# Patient Record
Sex: MALE | Race: WHITE | NOT HISPANIC OR LATINO | Employment: OTHER | URBAN - METROPOLITAN AREA
[De-identification: names, ages, dates, MRNs, and addresses within clinical notes are randomized per-mention and may not be internally consistent; named-entity substitution may affect disease eponyms.]

---

## 2018-05-01 ENCOUNTER — TRANSCRIBE ORDERS (OUTPATIENT)
Dept: ADMINISTRATIVE | Facility: HOSPITAL | Age: 83
End: 2018-05-01

## 2018-05-01 DIAGNOSIS — M15.9 GENERALIZED OA: Primary | ICD-10-CM

## 2018-05-03 ENCOUNTER — HOSPITAL ENCOUNTER (OUTPATIENT)
Dept: CT IMAGING | Facility: HOSPITAL | Age: 83
Discharge: HOME | End: 2018-05-03
Attending: ORTHOPAEDIC SURGERY | Admitting: ORTHOPAEDIC SURGERY

## 2018-05-03 ENCOUNTER — APPOINTMENT (OUTPATIENT)
Dept: PREADMISSION TESTING | Facility: HOSPITAL | Age: 83
End: 2018-05-03

## 2018-05-03 ENCOUNTER — APPOINTMENT (OUTPATIENT)
Dept: CT IMAGING | Facility: HOSPITAL | Age: 83
End: 2018-05-03
Attending: ORTHOPAEDIC SURGERY

## 2018-05-03 DIAGNOSIS — M15.9 GENERALIZED OA: ICD-10-CM

## 2018-05-03 PROCEDURE — 73200 CT UPPER EXTREMITY W/O DYE: CPT

## 2018-05-10 ENCOUNTER — APPOINTMENT (OUTPATIENT)
Dept: PREADMISSION TESTING | Facility: HOSPITAL | Age: 83
End: 2018-05-10

## 2018-05-10 ENCOUNTER — HOSPITAL ENCOUNTER (OUTPATIENT)
Dept: GENERAL RADIOLOGY | Facility: HOSPITAL | Age: 83
Discharge: HOME | End: 2018-05-10
Admitting: ORTHOPAEDIC SURGERY

## 2018-05-10 VITALS
WEIGHT: 205.4 LBS | RESPIRATION RATE: 16 BRPM | BODY MASS INDEX: 30.42 KG/M2 | DIASTOLIC BLOOD PRESSURE: 66 MMHG | TEMPERATURE: 97.5 F | SYSTOLIC BLOOD PRESSURE: 140 MMHG | HEART RATE: 64 BPM | HEIGHT: 69 IN | OXYGEN SATURATION: 96 %

## 2018-05-10 LAB
ABO GROUP BLD: NORMAL
ANION GAP SERPL CALCULATED.3IONS-SCNC: 12.5 MMOL/L
BILIRUB UR QL STRIP: NEGATIVE
BLD GP AB SCN SERPL QL: NEGATIVE
BUN BLD-MCNC: 17 MG/DL (ref 8–23)
BUN/CREAT SERPL: 18.5 (ref 7–25)
CALCIUM SPEC-SCNC: 9.3 MG/DL (ref 8.6–10.5)
CHLORIDE SERPL-SCNC: 102 MMOL/L (ref 98–107)
CLARITY UR: CLEAR
CO2 SERPL-SCNC: 27.5 MMOL/L (ref 22–29)
COLOR UR: YELLOW
CREAT BLD-MCNC: 0.92 MG/DL (ref 0.76–1.27)
DEPRECATED RDW RBC AUTO: 49.2 FL (ref 37–54)
ERYTHROCYTE [DISTWIDTH] IN BLOOD BY AUTOMATED COUNT: 13 % (ref 11.5–14.5)
GFR SERPL CREATININE-BSD FRML MDRD: 79 ML/MIN/1.73
GLUCOSE BLD-MCNC: 119 MG/DL (ref 65–99)
GLUCOSE UR STRIP-MCNC: NEGATIVE MG/DL
HCT VFR BLD AUTO: 46.5 % (ref 40.4–52.2)
HGB BLD-MCNC: 15.1 G/DL (ref 13.7–17.6)
HGB UR QL STRIP.AUTO: NEGATIVE
KETONES UR QL STRIP: NEGATIVE
LEUKOCYTE ESTERASE UR QL STRIP.AUTO: NEGATIVE
MCH RBC QN AUTO: 33.5 PG (ref 27–32.7)
MCHC RBC AUTO-ENTMCNC: 32.5 G/DL (ref 32.6–36.4)
MCV RBC AUTO: 103.1 FL (ref 79.8–96.2)
NITRITE UR QL STRIP: NEGATIVE
PH UR STRIP.AUTO: 7 [PH] (ref 5–8)
PLATELET # BLD AUTO: 169 10*3/MM3 (ref 140–500)
PMV BLD AUTO: 9.9 FL (ref 6–12)
POTASSIUM BLD-SCNC: 4.7 MMOL/L (ref 3.5–5.2)
PROT UR QL STRIP: NEGATIVE
RBC # BLD AUTO: 4.51 10*6/MM3 (ref 4.6–6)
RH BLD: POSITIVE
SODIUM BLD-SCNC: 142 MMOL/L (ref 136–145)
SP GR UR STRIP: 1.02 (ref 1–1.03)
T&S EXPIRATION DATE: NORMAL
UROBILINOGEN UR QL STRIP: NORMAL
WBC NRBC COR # BLD: 5.14 10*3/MM3 (ref 4.5–10.7)

## 2018-05-10 PROCEDURE — 86900 BLOOD TYPING SEROLOGIC ABO: CPT | Performed by: ORTHOPAEDIC SURGERY

## 2018-05-10 PROCEDURE — 73030 X-RAY EXAM OF SHOULDER: CPT

## 2018-05-10 PROCEDURE — 86901 BLOOD TYPING SEROLOGIC RH(D): CPT | Performed by: ORTHOPAEDIC SURGERY

## 2018-05-10 PROCEDURE — 81003 URINALYSIS AUTO W/O SCOPE: CPT | Performed by: ORTHOPAEDIC SURGERY

## 2018-05-10 PROCEDURE — 86850 RBC ANTIBODY SCREEN: CPT | Performed by: ORTHOPAEDIC SURGERY

## 2018-05-10 PROCEDURE — 36415 COLL VENOUS BLD VENIPUNCTURE: CPT

## 2018-05-10 PROCEDURE — 85027 COMPLETE CBC AUTOMATED: CPT | Performed by: ORTHOPAEDIC SURGERY

## 2018-05-10 PROCEDURE — 80048 BASIC METABOLIC PNL TOTAL CA: CPT | Performed by: ORTHOPAEDIC SURGERY

## 2018-05-10 RX ORDER — CHLORHEXIDINE GLUCONATE 500 MG/1
1 CLOTH TOPICAL TAKE AS DIRECTED
Status: ON HOLD | COMMUNITY
End: 2018-05-17

## 2018-05-10 RX ORDER — MOMETASONE FUROATE 50 UG/1
1 SPRAY, METERED NASAL DAILY
COMMUNITY

## 2018-05-10 RX ORDER — UBIDECARENONE 100 MG
100 CAPSULE ORAL DAILY
COMMUNITY

## 2018-05-10 NOTE — DISCHARGE INSTRUCTIONS
Take the following medications the morning of surgery with a small sip of water:  NONE    THE HOSPITAL WILL CALL YOU THE DAY BEFORE WITH YOUR ARRIVAL TIME.      General Instructions:  • Do not eat solid food after midnight the night before surgery.  • You may drink clear liquids day of surgery but must stop at least one hour before your hospital arrival time.  • It is beneficial for you to have a clear drink that contains carbohydrates the day of surgery.  We suggest a 12 to 20 ounce bottle of Gatorade or Powerade for non-diabetic patients or a 12 to 20 ounce bottle of G2 or Powerade Zero for diabetic patients. (Pediatric patients, are not advised to drink a 12 to 20 ounce carbohydrate drink)    Clear liquids are liquids you can see through.  Nothing red in color.     Plain water                               Sports drinks  Sodas                                   Gelatin (Jell-O)  Fruit juices without pulp such as white grape juice and apple juice  Popsicles that contain no fruit or yogurt  Tea or coffee (no cream or milk added)  Gatorade / Powerade  G2 / Powerade Zero    • Infants may have breast milk up to four hours before surgery.  • Infants drinking formula may drink formula up to six hours before surgery.   • Patients who avoid smoking, chewing tobacco and alcohol for 4 weeks prior to surgery have a reduced risk of post-operative complications.  Quit smoking as many days before surgery as you can.  • Do not smoke, use chewing tobacco or drink alcohol the day of surgery.   • If applicable bring your C-PAP/ BI-PAP machine.  • Bring any papers given to you in the doctor’s office.  • Wear clean comfortable clothes and socks.  • Do not wear contact lenses or make-up.  Bring a case for your glasses.   • Bring crutches or walker if applicable.  • Remove all piercings.  Leave jewelry and any other valuables at home.  • Hair extensions with metal clips must be removed prior to surgery.  • The Pre-Admission Testing  nurse will instruct you to bring medications if unable to obtain an accurate list in Pre-Admission Testing.        If you were given a blood bank ID arm band remember to bring it with you the day of surgery.    Preventing a Surgical Site Infection:  • For 2 to 3 days before surgery, avoid shaving with a razor because the razor can irritate skin and make it easier to develop an infection.  • The night prior to surgery sleep in a clean bed with clean clothing.  Do not allow pets to sleep with you.  • Shower on the morning of surgery using a fresh bar of anti-bacterial soap (such as Dial) and clean washcloth.  Dry with a clean towel and dress in clean clothing.  • Ask your surgeon if you will be receiving antibiotics prior to surgery.  • Make sure you, your family, and all healthcare providers clean their hands with soap and water or an alcohol based hand  before caring for you or your wound.    Day of surgery:  Upon arrival, a Pre-op nurse and Anesthesiologist will review your health history, obtain vital signs, and answer questions you may have.  The only belongings needed at this time will be your home medications and if applicable your C-PAP/BI-PAP machine.  If you are staying overnight your family can leave the rest of your belongings in the car and bring them to your room later.  A Pre-op nurse will start an IV and you may receive medication in preparation for surgery, including something to help you relax.  Your family will be able to see you in the Pre-op area.  While you are in surgery your family should notify the waiting room  if they leave the waiting room area and provide a contact phone number.    Please be aware that surgery does come with discomfort.  We want to make every effort to control your discomfort so please discuss any uncontrolled symptoms with your nurse.   Your doctor will most likely have prescribed pain medications.      If you are going home after surgery you will  receive individualized written care instructions before being discharged.  A responsible adult must drive you to and from the hospital on the day of your surgery and stay with you for 24 hours.    If you are staying overnight following surgery, you will be transported to your hospital room following the recovery period.  [facility] has all private rooms.    If you have any questions please call Pre-Admission Testing at [phone].  Deductibles and co-payments are collected on the day of service. Please be prepared to pay the required co-pay, deductible or deposit on the day of service as defined by your plan.      2% CHLORAHEXIDINE GLUCONATE* CLOTH  Preparing or “prepping” skin before surgery can reduce the risk of infection at the surgical site. To make the process easier, [facility] has chosen disposable cloths moistened with a rinse-free, 2% Chlorhexidine Gluconate (CHG) antiseptic solution. The steps below outline the prepping process and should be carefully followed.        Use the prep cloth on the area that is circled in the diagram             Directions Night before Surgery  1) Shower using a fresh bar of anti-bacterial soap (such as Dial) and clean washcloth.  Use a clean towel to completely dry your skin.  2) Do not use any lotions, oils or creams on your skin.  3) Open the package and remove 1 cloth, wipe your skin for 30 seconds in a circular motion.  Allow to dry for 3 minutes.  4) Repeat #3 with second cloth.  5) Do not touch your eyes, ears, or mouth with the prep cloth.  6) Allow the wet prep solution to air dry.  7) Discard the prep cloth and wash your hands with soap and water.   8) Dress in clean bed clothes and sleep on fresh clean bed sheets.   9) You may experience some temporary itching after the prep.    Directions Day of Surgery  1) Repeat steps 1,2,3,4,5,6,7, and 9.   2) Dress in clean clothes before coming to the hospital.    BACTROBAN NASAL OINTMENT  There are  many germs normally in your nose. Bactroban is an ointment that will help reduce these germs. Please follow these instructions for Bactroban use:      ____The day before surgery in the morning  Date________    ____The day before surgery in the evening              Date________    ____The day of surgery in the morning    Date________    **Squirt ½ package of Bactroban Ointment onto a cotton applicator and apply to inside of 1st nostril.  Squirt the remaining Bactroban and apply to the inside of the other nostril.

## 2018-05-15 NOTE — H&P
Provider: HONG COHEN MD  [facility]  Chief complaint right shoulder pain.  83-year-old  male has had ongoing pain in the right shoulder.  He has had previous injection therapy.  He'D like to move on the definitive care.  Review of Systems:  Patient denies: Abdominal Pain, Bleeding, Chest Pain, Convulsions/Seizure, Decreased Motion, Depression, Difficulty Swallowing, Easy Bruisability, Emotional Disturbances, Eyes or Vision Problems, Fecal Incontinence, Fever/Chills, Headaches, Increased Thirst, Increased Hunger, Insomnia, Joint Pain, Nausea/Vomiting, Night Sweats, Poor Balance, Persistent Cough, Rash, Shortness of Breath, Shortness of Breath While Lying down, Skin Problems, Urinary Retention and Weakness.  Allergies:  * no known allergies  Medications:  flonase 50 mcg/act nasal suspension (fluticasone propionate)   aspirin 81 mg tabs (aspirin)   ocuvite extra oral tablet (multiple vitamins-minerals) ocne daily  glucosamine hcl tablet (glucosamine hcl tabs) once daily  co q 10 100 mg oral capsule (coenzyme q10) once daily  lisinopril 20 mg oral tablet (lisinopril) once daily  lipitor 20 mg oral tablet (atorvastatin calcium) once daily  plavix 75 mg oral tablet (clopidogrel bisulfate) once daily  Patient History of:  TRANSIENT ISCHEMIC ATTACK (TIA)  PROSTATE  HIGH CHOLESTEROL  BLOOD CLOTS/EMBOLISM - NEGATIVE  HYPERTENSION  CANCER - PROSTATE  Surgical History:  Hernia Repair-[[id number]]   No Previous Non-orthopaedic Surgery-history   right Elbow-   left Knee-   Known Family History of:  Negative  Social History:  Social history taken on 04/30/2018 states PREETI DARLING is a  83 year old male.  He has never used tobacco products.  He has not fallen in the last 12 months.      Past medical, social, family histories and ROS reviewed today with the patient and changes documented in the chart (04/30/2018).  PCP Dr. JANEEN SALAZAR, [facility]    Physical Exam  Height:  68 in.    Weight:  211.2 lbs.     BMI:  32.23  Pulse:   63  Blood pressure:  120 / 82 mm Hg  Mental/HEENT/Cardio/Skin  Orientation is orientation was alert and oriented x3.    Right Shoulder  Skin is normal.  Lymphadenopathy is negative.  Right shoulder active ROM today shows: Elevation = 140; ER(side) = 30; ER(abd) = 60; IR(abd) = 70; IR(vert) = to waist; Abduction = 120.  Shoulder strength: Elevation = 4+/5; ER = 4/5; IR = 4+/5; ABD = 4/5.  Crepitation in the GH.  There is tenderness in the anterior shoulder capsule.  Neer test is positive.  Rangel test is positive.  Empty can test was positive.  Speed's test is positive.  [place]'s test is positive.  Pulses are normal.  Normal sensation.  The c-spine has normal motion.  Spurling right is negative.  Spurling left is negative.  Adson's test is negative.  NV intact distally.        Imaging/Diagnostic Studies  X-rays of the Right Shoulder [AP;Axillary;Grashey;Y] were ordered and reviewed today.    Three-view x-ray right shoulder shows bone-on-bone osteoarthritic change with some posterior erosion  CT UPPER EXTREMITY RIGHT WO CONTRAST:   CT UPPER EXTREMITY RIGHT WO CONTRAST:   including automated exposure control and exposure modulation based on  body size.  .  FINDINGS: There is advanced degenerative change at the glenohumeral  joint with full-thickness chondral loss and some mild bony remodeling  along the posterior two-thirds of the glenoid. This creates 15? to 20?  of retroversion of the articular surface of the glenoid. There are  marginal osteophytes and some subcortical sclerosis but no significant  subcortical cystic change.  .  The rotator cuff muscles are normal in attenuation and bulk. There is no  musculotendinous retraction. The long head biceps tendon is intact. Some  acromioclavicular arthropathy is observed with mild bony undersurface  prominence.  .  There is no lesion in the visualized axilla or the visualized part of  the right lung.  .  Advanced arthritic change at the right glenohumeral joint  with  some bony remodeling at the glenoid as described. There is no  evidence of a complete, chronic full-thickness rotator cuff tear.    Impression  Right glenohumeral joint osteoarthritis    Plan  Patient does not use tobacco.  We discussed the findings in detail.  I recommend a right reverse total shoulder arthroplasty.   We discussed the benefits of surgical intervention, as well as alternative treatments.  Potential surgical risks and complications include but are not limited to DVT, infection, neurovascular injury, fracture, implant wear, failure, possible need for revision surgery, loss of motion, dislocation, limb length changes.  Sufficient opportunity was given to discuss the condition and treatment plan with the doctor, and all questions were answered for the patient.  Nonsurgical measures such as injections, medications, or physical therapy may not offer significant relief to this patient.

## 2018-05-17 ENCOUNTER — APPOINTMENT (OUTPATIENT)
Dept: GENERAL RADIOLOGY | Facility: HOSPITAL | Age: 83
End: 2018-05-17

## 2018-05-17 ENCOUNTER — HOSPITAL ENCOUNTER (INPATIENT)
Facility: HOSPITAL | Age: 83
LOS: 1 days | Discharge: HOME | End: 2018-05-18
Attending: ORTHOPAEDIC SURGERY | Admitting: ORTHOPAEDIC SURGERY

## 2018-05-17 ENCOUNTER — ANESTHESIA EVENT (OUTPATIENT)
Dept: PERIOP | Facility: HOSPITAL | Age: 83
End: 2018-05-17

## 2018-05-17 ENCOUNTER — ANESTHESIA (OUTPATIENT)
Dept: PERIOP | Facility: HOSPITAL | Age: 83
End: 2018-05-17

## 2018-05-17 PROBLEM — M25.311 ROTATOR CUFF INSUFFICIENCY OF RIGHT SHOULDER: Status: ACTIVE | Noted: 2018-05-17

## 2018-05-17 PROCEDURE — C1776 JOINT DEVICE (IMPLANTABLE): HCPCS | Performed by: ORTHOPAEDIC SURGERY

## 2018-05-17 PROCEDURE — 25010000002 FENTANYL CITRATE (PF) 100 MCG/2ML SOLUTION: Performed by: ANESTHESIOLOGY

## 2018-05-17 PROCEDURE — 25010000002 ROPIVACAINE PER 1 MG: Performed by: ANESTHESIOLOGY

## 2018-05-17 PROCEDURE — 25010000003 CEFAZOLIN IN DEXTROSE 2-4 GM/100ML-% SOLUTION: Performed by: ORTHOPAEDIC SURGERY

## 2018-05-17 PROCEDURE — 25010000002 ONDANSETRON PER 1 MG: Performed by: NURSE ANESTHETIST, CERTIFIED REGISTERED

## 2018-05-17 PROCEDURE — 25010000002 NEOSTIGMINE PER 0.5 MG: Performed by: NURSE ANESTHETIST, CERTIFIED REGISTERED

## 2018-05-17 PROCEDURE — 25010000002 PROPOFOL 10 MG/ML EMULSION: Performed by: NURSE ANESTHETIST, CERTIFIED REGISTERED

## 2018-05-17 PROCEDURE — 25010000002 DEXAMETHASONE PER 1 MG: Performed by: ANESTHESIOLOGY

## 2018-05-17 PROCEDURE — 0RRJ00Z REPLACEMENT OF RIGHT SHOULDER JOINT WITH REVERSE BALL AND SOCKET SYNTHETIC SUBSTITUTE, OPEN APPROACH: ICD-10-PCS | Performed by: ORTHOPAEDIC SURGERY

## 2018-05-17 PROCEDURE — 73020 X-RAY EXAM OF SHOULDER: CPT

## 2018-05-17 PROCEDURE — 25010000002 MIDAZOLAM PER 1 MG: Performed by: ANESTHESIOLOGY

## 2018-05-17 DEVICE — TRY HUM EQUINOXE ADPT REV SHLDR PLS0: Type: IMPLANTABLE DEVICE | Site: SHOULDER | Status: FUNCTIONAL

## 2018-05-17 DEVICE — IMPLANTABLE DEVICE: Type: IMPLANTABLE DEVICE | Site: HUMERUS | Status: FUNCTIONAL

## 2018-05-17 DEVICE — SCRW EQUINOXE TORQ DEFINE REV SHLDR KT: Type: IMPLANTABLE DEVICE | Site: SHOULDER | Status: FUNCTIONAL

## 2018-05-17 DEVICE — GLENOSPHERE SHLDR/REV EQUINOXE 42MM: Type: IMPLANTABLE DEVICE | Site: SHOULDER | Status: FUNCTIONAL

## 2018-05-17 DEVICE — SCRW COMPR EQUINOXE LK 4.5X26MM: Type: IMPLANTABLE DEVICE | Site: SHOULDER | Status: FUNCTIONAL

## 2018-05-17 DEVICE — SCRW COMPR EQUINOXE LK 4.5X22MM: Type: IMPLANTABLE DEVICE | Site: SHOULDER | Status: FUNCTIONAL

## 2018-05-17 DEVICE — SCRW COMPR EQUINOXE LK 4.5X30MM: Type: IMPLANTABLE DEVICE | Site: SHOULDER | Status: FUNCTIONAL

## 2018-05-17 DEVICE — LINER HUM EQUINOXE REV SHLDR 42 PLS2.5: Type: IMPLANTABLE DEVICE | Site: SHOULDER | Status: FUNCTIONAL

## 2018-05-17 DEVICE — SCRW LK EQUINOXE GLENOSPHERE REV/SHLDR: Type: IMPLANTABLE DEVICE | Site: SHOULDER | Status: FUNCTIONAL

## 2018-05-17 DEVICE — TOTL SHLDR AUG PLT ARTHROPLASTY UPCHRG: Type: IMPLANTABLE DEVICE | Site: HUMERUS | Status: FUNCTIONAL

## 2018-05-17 DEVICE — SCRW COMPR EQUINOXE LK 4.5X18MM: Type: IMPLANTABLE DEVICE | Site: SHOULDER | Status: FUNCTIONAL

## 2018-05-17 DEVICE — PLT GLEN JNT EQUINOXE AUG POST 8DEG RT: Type: IMPLANTABLE DEVICE | Site: SHOULDER | Status: FUNCTIONAL

## 2018-05-17 RX ORDER — FLUMAZENIL 0.1 MG/ML
0.2 INJECTION INTRAVENOUS AS NEEDED
Status: DISCONTINUED | OUTPATIENT
Start: 2018-05-17 | End: 2018-05-17 | Stop reason: HOSPADM

## 2018-05-17 RX ORDER — HYDROMORPHONE HYDROCHLORIDE 1 MG/ML
0.5 INJECTION, SOLUTION INTRAMUSCULAR; INTRAVENOUS; SUBCUTANEOUS
Status: DISCONTINUED | OUTPATIENT
Start: 2018-05-17 | End: 2018-05-17 | Stop reason: HOSPADM

## 2018-05-17 RX ORDER — NALOXONE HCL 0.4 MG/ML
0.4 VIAL (ML) INJECTION
Status: DISCONTINUED | OUTPATIENT
Start: 2018-05-17 | End: 2018-05-18 | Stop reason: HOSPADM

## 2018-05-17 RX ORDER — HYDRALAZINE HYDROCHLORIDE 20 MG/ML
5 INJECTION INTRAMUSCULAR; INTRAVENOUS
Status: DISCONTINUED | OUTPATIENT
Start: 2018-05-17 | End: 2018-05-17 | Stop reason: HOSPADM

## 2018-05-17 RX ORDER — EPHEDRINE SULFATE 50 MG/ML
5 INJECTION, SOLUTION INTRAVENOUS ONCE AS NEEDED
Status: DISCONTINUED | OUTPATIENT
Start: 2018-05-17 | End: 2018-05-17 | Stop reason: HOSPADM

## 2018-05-17 RX ORDER — LABETALOL HYDROCHLORIDE 5 MG/ML
5 INJECTION, SOLUTION INTRAVENOUS
Status: DISCONTINUED | OUTPATIENT
Start: 2018-05-17 | End: 2018-05-17 | Stop reason: HOSPADM

## 2018-05-17 RX ORDER — ONDANSETRON 4 MG/1
4 TABLET, ORALLY DISINTEGRATING ORAL EVERY 6 HOURS PRN
Status: DISCONTINUED | OUTPATIENT
Start: 2018-05-17 | End: 2018-05-18 | Stop reason: HOSPADM

## 2018-05-17 RX ORDER — DEXAMETHASONE SODIUM PHOSPHATE 4 MG/ML
INJECTION, SOLUTION INTRA-ARTICULAR; INTRALESIONAL; INTRAMUSCULAR; INTRAVENOUS; SOFT TISSUE AS NEEDED
Status: DISCONTINUED | OUTPATIENT
Start: 2018-05-17 | End: 2018-05-17 | Stop reason: SURG

## 2018-05-17 RX ORDER — MAGNESIUM HYDROXIDE 1200 MG/15ML
LIQUID ORAL AS NEEDED
Status: DISCONTINUED | OUTPATIENT
Start: 2018-05-17 | End: 2018-05-17 | Stop reason: HOSPADM

## 2018-05-17 RX ORDER — CLOPIDOGREL BISULFATE 75 MG/1
75 TABLET ORAL DAILY
Status: DISCONTINUED | OUTPATIENT
Start: 2018-05-17 | End: 2018-05-18 | Stop reason: HOSPADM

## 2018-05-17 RX ORDER — PROMETHAZINE HYDROCHLORIDE 25 MG/ML
6.25 INJECTION, SOLUTION INTRAMUSCULAR; INTRAVENOUS ONCE AS NEEDED
Status: DISCONTINUED | OUTPATIENT
Start: 2018-05-17 | End: 2018-05-17 | Stop reason: HOSPADM

## 2018-05-17 RX ORDER — GLYCOPYRROLATE 0.2 MG/ML
INJECTION INTRAMUSCULAR; INTRAVENOUS AS NEEDED
Status: DISCONTINUED | OUTPATIENT
Start: 2018-05-17 | End: 2018-05-17 | Stop reason: SURG

## 2018-05-17 RX ORDER — PROMETHAZINE HYDROCHLORIDE 25 MG/1
25 SUPPOSITORY RECTAL ONCE AS NEEDED
Status: DISCONTINUED | OUTPATIENT
Start: 2018-05-17 | End: 2018-05-17 | Stop reason: HOSPADM

## 2018-05-17 RX ORDER — ONDANSETRON 2 MG/ML
4 INJECTION INTRAMUSCULAR; INTRAVENOUS EVERY 6 HOURS PRN
Status: DISCONTINUED | OUTPATIENT
Start: 2018-05-17 | End: 2018-05-18 | Stop reason: HOSPADM

## 2018-05-17 RX ORDER — PROMETHAZINE HYDROCHLORIDE 25 MG/1
25 TABLET ORAL ONCE AS NEEDED
Status: DISCONTINUED | OUTPATIENT
Start: 2018-05-17 | End: 2018-05-17 | Stop reason: HOSPADM

## 2018-05-17 RX ORDER — ASPIRIN 81 MG/1
81 TABLET, CHEWABLE ORAL DAILY
Status: DISCONTINUED | OUTPATIENT
Start: 2018-05-18 | End: 2018-05-18 | Stop reason: HOSPADM

## 2018-05-17 RX ORDER — SODIUM CHLORIDE 0.9 % (FLUSH) 0.9 %
1-10 SYRINGE (ML) INJECTION AS NEEDED
Status: DISCONTINUED | OUTPATIENT
Start: 2018-05-17 | End: 2018-05-17 | Stop reason: HOSPADM

## 2018-05-17 RX ORDER — MIDAZOLAM HYDROCHLORIDE 1 MG/ML
1 INJECTION INTRAMUSCULAR; INTRAVENOUS
Status: DISCONTINUED | OUTPATIENT
Start: 2018-05-17 | End: 2018-05-17 | Stop reason: HOSPADM

## 2018-05-17 RX ORDER — ONDANSETRON 2 MG/ML
INJECTION INTRAMUSCULAR; INTRAVENOUS AS NEEDED
Status: DISCONTINUED | OUTPATIENT
Start: 2018-05-17 | End: 2018-05-17 | Stop reason: SURG

## 2018-05-17 RX ORDER — CEFAZOLIN SODIUM 2 G/100ML
2 INJECTION, SOLUTION INTRAVENOUS ONCE
Status: COMPLETED | OUTPATIENT
Start: 2018-05-17 | End: 2018-05-17

## 2018-05-17 RX ORDER — ONDANSETRON 4 MG/1
4 TABLET, FILM COATED ORAL EVERY 6 HOURS PRN
Status: DISCONTINUED | OUTPATIENT
Start: 2018-05-17 | End: 2018-05-18 | Stop reason: HOSPADM

## 2018-05-17 RX ORDER — SENNA AND DOCUSATE SODIUM 50; 8.6 MG/1; MG/1
2 TABLET, FILM COATED ORAL NIGHTLY
Status: DISCONTINUED | OUTPATIENT
Start: 2018-05-18 | End: 2018-05-18 | Stop reason: HOSPADM

## 2018-05-17 RX ORDER — FENTANYL CITRATE 50 UG/ML
100 INJECTION, SOLUTION INTRAMUSCULAR; INTRAVENOUS
Status: DISCONTINUED | OUTPATIENT
Start: 2018-05-17 | End: 2018-05-17 | Stop reason: HOSPADM

## 2018-05-17 RX ORDER — DIAZEPAM 5 MG/1
5 TABLET ORAL EVERY 6 HOURS PRN
Status: DISCONTINUED | OUTPATIENT
Start: 2018-05-17 | End: 2018-05-18 | Stop reason: HOSPADM

## 2018-05-17 RX ORDER — PROPOFOL 10 MG/ML
VIAL (ML) INTRAVENOUS AS NEEDED
Status: DISCONTINUED | OUTPATIENT
Start: 2018-05-17 | End: 2018-05-17 | Stop reason: SURG

## 2018-05-17 RX ORDER — ROCURONIUM BROMIDE 10 MG/ML
INJECTION, SOLUTION INTRAVENOUS AS NEEDED
Status: DISCONTINUED | OUTPATIENT
Start: 2018-05-17 | End: 2018-05-17 | Stop reason: SURG

## 2018-05-17 RX ORDER — HYDROCODONE BITARTRATE AND ACETAMINOPHEN 7.5; 325 MG/1; MG/1
1 TABLET ORAL ONCE AS NEEDED
Status: DISCONTINUED | OUTPATIENT
Start: 2018-05-17 | End: 2018-05-17 | Stop reason: HOSPADM

## 2018-05-17 RX ORDER — LISINOPRIL 2.5 MG/1
2.5 TABLET ORAL DAILY
Status: DISCONTINUED | OUTPATIENT
Start: 2018-05-17 | End: 2018-05-18 | Stop reason: HOSPADM

## 2018-05-17 RX ORDER — FENTANYL CITRATE 50 UG/ML
50 INJECTION, SOLUTION INTRAMUSCULAR; INTRAVENOUS
Status: DISCONTINUED | OUTPATIENT
Start: 2018-05-17 | End: 2018-05-17 | Stop reason: HOSPADM

## 2018-05-17 RX ORDER — DIPHENHYDRAMINE HYDROCHLORIDE 50 MG/ML
6.25 INJECTION INTRAMUSCULAR; INTRAVENOUS
Status: DISCONTINUED | OUTPATIENT
Start: 2018-05-17 | End: 2018-05-17 | Stop reason: HOSPADM

## 2018-05-17 RX ORDER — SODIUM CHLORIDE, SODIUM LACTATE, POTASSIUM CHLORIDE, CALCIUM CHLORIDE 600; 310; 30; 20 MG/100ML; MG/100ML; MG/100ML; MG/100ML
9 INJECTION, SOLUTION INTRAVENOUS CONTINUOUS
Status: DISCONTINUED | OUTPATIENT
Start: 2018-05-17 | End: 2018-05-18 | Stop reason: HOSPADM

## 2018-05-17 RX ORDER — FAMOTIDINE 10 MG/ML
20 INJECTION, SOLUTION INTRAVENOUS ONCE
Status: COMPLETED | OUTPATIENT
Start: 2018-05-17 | End: 2018-05-17

## 2018-05-17 RX ORDER — ONDANSETRON 2 MG/ML
4 INJECTION INTRAMUSCULAR; INTRAVENOUS ONCE AS NEEDED
Status: DISCONTINUED | OUTPATIENT
Start: 2018-05-17 | End: 2018-05-17 | Stop reason: HOSPADM

## 2018-05-17 RX ORDER — CEFAZOLIN SODIUM 2 G/100ML
2 INJECTION, SOLUTION INTRAVENOUS EVERY 8 HOURS
Status: COMPLETED | OUTPATIENT
Start: 2018-05-17 | End: 2018-05-18

## 2018-05-17 RX ORDER — MIDAZOLAM HYDROCHLORIDE 1 MG/ML
2 INJECTION INTRAMUSCULAR; INTRAVENOUS
Status: DISCONTINUED | OUTPATIENT
Start: 2018-05-17 | End: 2018-05-17 | Stop reason: HOSPADM

## 2018-05-17 RX ORDER — SODIUM CHLORIDE 450 MG/100ML
75 INJECTION, SOLUTION INTRAVENOUS CONTINUOUS
Status: DISCONTINUED | OUTPATIENT
Start: 2018-05-17 | End: 2018-05-18 | Stop reason: HOSPADM

## 2018-05-17 RX ORDER — ATORVASTATIN CALCIUM 20 MG/1
20 TABLET, FILM COATED ORAL DAILY
Status: DISCONTINUED | OUTPATIENT
Start: 2018-05-17 | End: 2018-05-18 | Stop reason: HOSPADM

## 2018-05-17 RX ORDER — LIDOCAINE HYDROCHLORIDE 20 MG/ML
INJECTION, SOLUTION INFILTRATION; PERINEURAL AS NEEDED
Status: DISCONTINUED | OUTPATIENT
Start: 2018-05-17 | End: 2018-05-17 | Stop reason: SURG

## 2018-05-17 RX ORDER — OXYCODONE HYDROCHLORIDE AND ACETAMINOPHEN 5; 325 MG/1; MG/1
2 TABLET ORAL EVERY 4 HOURS PRN
Status: DISCONTINUED | OUTPATIENT
Start: 2018-05-17 | End: 2018-05-18 | Stop reason: HOSPADM

## 2018-05-17 RX ORDER — BISACODYL 10 MG
10 SUPPOSITORY, RECTAL RECTAL DAILY PRN
Status: DISCONTINUED | OUTPATIENT
Start: 2018-05-17 | End: 2018-05-18 | Stop reason: HOSPADM

## 2018-05-17 RX ORDER — ROPIVACAINE HYDROCHLORIDE 5 MG/ML
INJECTION, SOLUTION EPIDURAL; INFILTRATION; PERINEURAL AS NEEDED
Status: DISCONTINUED | OUTPATIENT
Start: 2018-05-17 | End: 2018-05-17 | Stop reason: SURG

## 2018-05-17 RX ORDER — OXYCODONE HYDROCHLORIDE AND ACETAMINOPHEN 5; 325 MG/1; MG/1
2 TABLET ORAL ONCE AS NEEDED
Status: DISCONTINUED | OUTPATIENT
Start: 2018-05-17 | End: 2018-05-17 | Stop reason: HOSPADM

## 2018-05-17 RX ORDER — LIDOCAINE HYDROCHLORIDE 10 MG/ML
0.5 INJECTION, SOLUTION EPIDURAL; INFILTRATION; INTRACAUDAL; PERINEURAL ONCE AS NEEDED
Status: COMPLETED | OUTPATIENT
Start: 2018-05-17 | End: 2018-05-17

## 2018-05-17 RX ADMIN — MUPIROCIN: 20 OINTMENT TOPICAL at 18:22

## 2018-05-17 RX ADMIN — FENTANYL CITRATE 50 MCG: 50 INJECTION INTRAMUSCULAR; INTRAVENOUS at 11:34

## 2018-05-17 RX ADMIN — ROPIVACAINE HYDROCHLORIDE 20 ML: 5 INJECTION, SOLUTION EPIDURAL; INFILTRATION; PERINEURAL at 11:41

## 2018-05-17 RX ADMIN — MIDAZOLAM HYDROCHLORIDE 1 MG: 2 INJECTION, SOLUTION INTRAMUSCULAR; INTRAVENOUS at 11:34

## 2018-05-17 RX ADMIN — NEOSTIGMINE METHYLSULFATE 4 MG: 1 INJECTION INTRAMUSCULAR; INTRAVENOUS; SUBCUTANEOUS at 15:30

## 2018-05-17 RX ADMIN — LIDOCAINE HYDROCHLORIDE 100 MG: 20 INJECTION, SOLUTION INFILTRATION; PERINEURAL at 13:02

## 2018-05-17 RX ADMIN — MIDAZOLAM HYDROCHLORIDE 1 MG: 2 INJECTION, SOLUTION INTRAMUSCULAR; INTRAVENOUS at 10:49

## 2018-05-17 RX ADMIN — GLYCOPYRROLATE 0.6 MG: 0.2 INJECTION INTRAMUSCULAR; INTRAVENOUS at 15:30

## 2018-05-17 RX ADMIN — CEFAZOLIN SODIUM 2 G: 2 INJECTION, SOLUTION INTRAVENOUS at 13:00

## 2018-05-17 RX ADMIN — DEXAMETHASONE SODIUM PHOSPHATE 4 MG: 4 INJECTION, SOLUTION INTRAMUSCULAR; INTRAVENOUS at 11:41

## 2018-05-17 RX ADMIN — SODIUM CHLORIDE, POTASSIUM CHLORIDE, SODIUM LACTATE AND CALCIUM CHLORIDE: 600; 310; 30; 20 INJECTION, SOLUTION INTRAVENOUS at 13:40

## 2018-05-17 RX ADMIN — SODIUM CHLORIDE 75 ML/HR: 4.5 INJECTION, SOLUTION INTRAVENOUS at 18:22

## 2018-05-17 RX ADMIN — PROPOFOL 150 MG: 10 INJECTION, EMULSION INTRAVENOUS at 13:02

## 2018-05-17 RX ADMIN — CEFAZOLIN SODIUM 2 G: 2 INJECTION, SOLUTION INTRAVENOUS at 20:14

## 2018-05-17 RX ADMIN — LIDOCAINE HYDROCHLORIDE 0.5 ML: 10 INJECTION, SOLUTION EPIDURAL; INFILTRATION; INTRACAUDAL; PERINEURAL at 10:49

## 2018-05-17 RX ADMIN — SODIUM CHLORIDE, POTASSIUM CHLORIDE, SODIUM LACTATE AND CALCIUM CHLORIDE 9 ML/HR: 600; 310; 30; 20 INJECTION, SOLUTION INTRAVENOUS at 10:49

## 2018-05-17 RX ADMIN — FAMOTIDINE 20 MG: 10 INJECTION INTRAVENOUS at 10:49

## 2018-05-17 RX ADMIN — ONDANSETRON 4 MG: 2 INJECTION INTRAMUSCULAR; INTRAVENOUS at 15:28

## 2018-05-17 RX ADMIN — ROCURONIUM BROMIDE 50 MG: 10 INJECTION INTRAVENOUS at 13:02

## 2018-05-17 NOTE — ANESTHESIA PROCEDURE NOTES
Peripheral Block    Patient location during procedure: pre-op  Start time: 5/17/2018 11:31 AM  Stop time: 5/17/2018 11:42 AM  Reason for block: at surgeon's request and post-op pain management  Performed by  Anesthesiologist: PREETI ALBERT  Preanesthetic Checklist  Completed: patient identified, site marked, surgical consent, pre-op evaluation, timeout performed, IV checked, risks and benefits discussed and monitors and equipment checked  Prep:  Pt Position: supine  Sterile barriers:cap, gloves, mask and sterile barriers  Prep: ChloraPrep  Patient monitoring: blood pressure monitoring, continuous pulse oximetry and EKG  Procedure  Sedation:yes  Performed under: local infiltration  Guidance:ultrasound guided  ULTRASOUND INTERPRETATION.  Using ultrasound guidance a 22 G gauge needle was placed in close proximity to the brachial plexus nerve, at which point, under ultrasound guidance anesthetic was injected in the area of the nerve and spread of the anesthesia was seen on ultrasound in close proximity thereto.  There were no abnormalities seen on ultrasound; a digital image was taken; and the patient tolerated the procedure with no complications. Images:still images obtained    Laterality:right  Block Type:interscalene  Injection Technique:single-shot  Needle Type:echogenic  Needle Gauge:22 G  Resistance on Injection: less than 15 psi  Medications  Local Injected:ropivacaine 0.5% without epinephrine Local Amount Injected:20mL  Post Assessment  Injection Assessment: negative aspiration for heme, no paresthesia on injection and incremental injection  Patient Tolerance:comfortable throughout block  Complications:no  Additional Notes  rop 20cc / dex 4mg

## 2018-05-17 NOTE — OP NOTE
TOTAL SHOULDER REVERSE ARTHROPLASTY  Procedure Note    Jorge Luis Garces  5/17/2018    Pre-op Diagnosis:   Right Rotator cuff arthropathy    Post-op Diagnosis   Right Rotator cuff arthropathy    Procedure:   Right Reverse total shoulder arthroplasty with posterior augmentation    Surgeon: Enrrique Ren M.D.    Surgical assistant: Lily CRUZ      Anesthesia: General with Block  Anesthesiologist: Urbano He MD; Jroge Luis Ibarra MD  CRNA: Laisha Thomas CRNA; Rebecca Meadows CRNA    Staff:   Circulator: Iris Mitchell RN; Jessi Wilkinson RN  Scrub Person: Reina Rockwell  Vendor Representative: Rodney Jara;  Assistant: Lily Chapman    Indication for Asst.  A surgical assistant, Lily CRUZ  , was utilized as a medical necessity and was present through the entire procedure allowing safe completion of the procedure as well as reducing overall operative time and morbidity for the patient.    IV antibiotic:    Estimated Blood Loss: 200 mL    Specimens: * No orders in the log *    Complications: None    Implants: [employer] reverse total shoulder system     Implant specifics: #14 preserve stem, +0 humeral tray with +2.5 mm polyethylene 42 mm, 8° posterior augment 38 mm base plate with 42 mm glenosphere      Procedure: The patient was taken to the operative suite.  After adequate anesthesia was established the upper extremity was prepped and draped in the usual fashion.  Ioban was utilized covering the skin over the shoulder and axilla.  An anterior incision was made starting lateral to the coracoid towards the axillary crease through skin and subcutaneous tissues.  The deltopectoral interval was entered.  The cephalic vein was preserved.  The conjoined tendon was reflected medially.  The subscapularis was divided and tagged.  Severe Arthropathic changes were noted in the glenohumeral joint.  The rotator cuff was in poor condition with tearing noted.  There is osteophytic formation along  the greater tuberosity just lateral to the tear.  This indicated severe impingement.  The capsule was released off the humeral neck and the posterior soft tissue attachments were protected. An external cutting guide was utilized in the head was cut at a 20° retroverted angle.  Excess osteophytes were excised .  Appropriate reaming and broaching was carried out.  The appropriate size stem was chosen.  The proximal humerus was irrigated with antibiotic solution and the stem was press-fit into appropriate position.  I then visualized the glenoid and retractors were placed.  The capsule was reflected off the deep surface of the subscapularis.  The capsule was also released off the humeral neck and off the inferior glenoid protecting the axillary nerve throughout.  This allowed for visualization of the glenoid.  The glenoid was posteriorly eroded.  I first reamed with a 42 mm reamer to ream just slightly  on the high side.  I then used an 8° posterior augment guide and drilled a 0° pin.  A second guide was placed over the 0° pin and I drilled an 8° pin.  I reamed over the 8° pin with a cannulated reamer.  I then replaced the guide over the 8° and drilled the 0° pin again and remove the 8°.  I then placed a guide over the 0° pin and drilled the central cage hole.  I pulse lavaged antibiotic solution and press-fit the 38 mm base plate and then fixed it with 4 compression screws and locking caps.  I then trialed a 42 minus femur and eventually a 38 mm Glenosphere but chose the 42 to give best fit and rotational movement.  He was tight and I felt that I had to release some of the soft tissues and did so along the anterior acromion and coracoacromial ligament.  I also performed an acromioplasty to add clearance and removed a large osteophyte on the greater tuberosity that was impinging with rotational movement in the 90° plane.  Once I was satisfied with stability I placed a 42 Glenosphere  component and held with a central  compression screw.  I re-trialed humeral trays and polyethylenes and shows a +0 humeral tray and this was fixed to the humeral stem with a torque limiting screw.  I then placed the 42 mm +2.5 polyethylene and reduced the shoulder.  Subdeltoid scar tissue was excised  Good range of motion was noted.  Good stability was noted.  Vigorous irrigation and suctioning was performed.  The deltopectoral interval was closed with 0 Vicryl.  The subcutaneous tissues were closed with 2-0 Vicryl.  The skin was closed with staples the patient had a sterile compression dressing applied and was awoken and taken to the postanesthetic recovery unit in good condition.    Enrrique Ren MD     Date: 5/17/2018  Time: 3:42 PM

## 2018-05-17 NOTE — ANESTHESIA PROCEDURE NOTES
Airway  Urgency: elective    Airway not difficult    General Information and Staff    Patient location during procedure: OR  Anesthesiologist: MAREN HONG  CRNA: FRANCINE MARR    Indications and Patient Condition  Indications for airway management: airway protection    Preoxygenated: yes  MILS not maintained throughout  Mask difficulty assessment: 1 - vent by mask    Final Airway Details  Final airway type: endotracheal airway      Successful airway: ETT  Cuffed: yes   Successful intubation technique: direct laryngoscopy  Facilitating devices/methods: intubating stylet  Endotracheal tube insertion site: oral  Blade: Jara  Blade size: #2  ETT size: 7.5 mm  Cormack-Lehane Classification: grade IIa - partial view of glottis  Placement verified by: chest auscultation and capnometry   Cuff volume (mL): 10  Measured from: lips  Number of attempts at approach: 1    Additional Comments  Limited neck ROM, ett placed easily, appears atraumatic, dentition intact

## 2018-05-17 NOTE — PLAN OF CARE
Problem: Patient Care Overview  Goal: Plan of Care Review  Outcome: Ongoing (interventions implemented as appropriate)   05/17/18 7129   Coping/Psychosocial   Plan of Care Reviewed With patient   Plan of Care Review   Progress improving   OTHER   Outcome Summary No complaints of pain at this time. RUE numb from block. Dressing has small amount of drainage. D/c home tomorrow if doing well. DTV at 2200. Educated about BP monitoring.      Goal: Individualization and Mutuality  Outcome: Ongoing (interventions implemented as appropriate)    Goal: Discharge Needs Assessment  Outcome: Ongoing (interventions implemented as appropriate)    Goal: Interprofessional Rounds/Family Conf  Outcome: Ongoing (interventions implemented as appropriate)      Problem: Fall Risk (Adult)  Goal: Identify Related Risk Factors and Signs and Symptoms  Outcome: Outcome(s) achieved Date Met: 05/17/18    Goal: Absence of Fall  Outcome: Ongoing (interventions implemented as appropriate)      Problem: Shoulder Arthroplasty (Adult)  Goal: Signs and Symptoms of Listed Potential Problems Will be Absent, Minimized or Managed (Shoulder Arthroplasty)  Outcome: Ongoing (interventions implemented as appropriate)    Goal: Anesthesia/Sedation Recovery  Outcome: Ongoing (interventions implemented as appropriate)

## 2018-05-17 NOTE — ANESTHESIA POSTPROCEDURE EVALUATION
Patient: Jorge Luis Garces    Procedure Summary     Date:  05/17/18 Room / Location:   [facility]    Anesthesia Start:  1300 Anesthesia Stop:  1600    Procedure:  RT TOTAL SHOULDER REVERSE ARTHROPLASTY (Right Shoulder) Diagnosis:      Surgeon:  Enrrique Ren MD Provider:  Urbano He MD    Anesthesia Type:  general ASA Status:  3          Anesthesia Type: general  Last vitals  BP   170/76 (05/17/18 1615)   Temp   36.2 °C (97.2 °F) (05/17/18 1553)   Pulse   58 (05/17/18 1615)   Resp   16 (05/17/18 1615)     SpO2   100 % (05/17/18 1615)     Post Anesthesia Care and Evaluation    Patient location during evaluation: bedside  Patient participation: complete - patient participated  Level of consciousness: awake  Pain management: adequate  Airway patency: patent  Anesthetic complications: No anesthetic complications    Cardiovascular status: acceptable  Respiratory status: acceptable  Hydration status: acceptable    Comments: */76   Pulse 58   Temp 36.2 °C (97.2 °F) (Temporal Artery )   Resp 16   Wt 94.3 kg (208 lb)   SpO2 100%   BMI 30.72 kg/m²

## 2018-05-17 NOTE — ANESTHESIA PREPROCEDURE EVALUATION
Anesthesia Evaluation     Patient summary reviewed and Nursing notes reviewed   NPO Solid Status: > 8 hours             Airway   Mallampati: II  TM distance: >3 FB  Neck ROM: full  no difficulty expected  Dental - normal exam     Pulmonary - negative pulmonary ROS and normal exam   Cardiovascular - normal exam    (+) hypertension, PVD,     ROS comment: Normal stress ekg recently    Neuro/Psych  (+) TIA,     GI/Hepatic/Renal/Endo - negative ROS     Musculoskeletal (-) negative ROS    Abdominal  - normal exam   Substance History - negative use     OB/GYN negative ob/gyn ROS         Other                        Anesthesia Plan    ASA 3     general   (Isb popc)  intravenous induction   Anesthetic plan and risks discussed with patient.    Plan discussed with CRNA.

## 2018-05-18 VITALS
RESPIRATION RATE: 16 BRPM | DIASTOLIC BLOOD PRESSURE: 67 MMHG | OXYGEN SATURATION: 96 % | SYSTOLIC BLOOD PRESSURE: 118 MMHG | HEART RATE: 65 BPM | WEIGHT: 207.89 LBS | TEMPERATURE: 98.7 F | BODY MASS INDEX: 30.79 KG/M2 | HEIGHT: 69 IN

## 2018-05-18 LAB
ANION GAP SERPL CALCULATED.3IONS-SCNC: 12.6 MMOL/L
BUN BLD-MCNC: 14 MG/DL (ref 8–23)
BUN/CREAT SERPL: 14.9 (ref 7–25)
CALCIUM SPEC-SCNC: 8.8 MG/DL (ref 8.6–10.5)
CHLORIDE SERPL-SCNC: 103 MMOL/L (ref 98–107)
CO2 SERPL-SCNC: 23.4 MMOL/L (ref 22–29)
CREAT BLD-MCNC: 0.94 MG/DL (ref 0.76–1.27)
GFR SERPL CREATININE-BSD FRML MDRD: 77 ML/MIN/1.73
GLUCOSE BLD-MCNC: 141 MG/DL (ref 65–99)
HCT VFR BLD AUTO: 40 % (ref 40.4–52.2)
HGB BLD-MCNC: 12.9 G/DL (ref 13.7–17.6)
POTASSIUM BLD-SCNC: 4.5 MMOL/L (ref 3.5–5.2)
SODIUM BLD-SCNC: 139 MMOL/L (ref 136–145)

## 2018-05-18 PROCEDURE — 25010000003 CEFAZOLIN IN DEXTROSE 2-4 GM/100ML-% SOLUTION: Performed by: ORTHOPAEDIC SURGERY

## 2018-05-18 PROCEDURE — 85018 HEMOGLOBIN: CPT | Performed by: ORTHOPAEDIC SURGERY

## 2018-05-18 PROCEDURE — 85014 HEMATOCRIT: CPT | Performed by: ORTHOPAEDIC SURGERY

## 2018-05-18 PROCEDURE — 80048 BASIC METABOLIC PNL TOTAL CA: CPT | Performed by: ORTHOPAEDIC SURGERY

## 2018-05-18 RX ORDER — OXYCODONE HYDROCHLORIDE AND ACETAMINOPHEN 5; 325 MG/1; MG/1
2 TABLET ORAL EVERY 4 HOURS PRN
Qty: 40 TABLET | Refills: 0 | Status: SHIPPED | OUTPATIENT
Start: 2018-05-18

## 2018-05-18 RX ADMIN — OXYCODONE HYDROCHLORIDE AND ACETAMINOPHEN 2 TABLET: 5; 325 TABLET ORAL at 10:32

## 2018-05-18 RX ADMIN — CLOPIDOGREL 75 MG: 75 TABLET, FILM COATED ORAL at 08:50

## 2018-05-18 RX ADMIN — ASPIRIN 81 MG: 81 TABLET, CHEWABLE ORAL at 08:50

## 2018-05-18 RX ADMIN — OXYCODONE HYDROCHLORIDE AND ACETAMINOPHEN 2 TABLET: 5; 325 TABLET ORAL at 05:43

## 2018-05-18 RX ADMIN — ATORVASTATIN CALCIUM 20 MG: 20 TABLET, FILM COATED ORAL at 08:50

## 2018-05-18 RX ADMIN — LISINOPRIL 2.5 MG: 2.5 TABLET ORAL at 08:50

## 2018-05-18 RX ADMIN — CEFAZOLIN SODIUM 2 G: 2 INJECTION, SOLUTION INTRAVENOUS at 06:38

## 2018-05-18 NOTE — DISCHARGE SUMMARY
Orthopedic Discharge Summary      Patient: Jorge Luis Garces      YOB: 1934    Medical Record Number: [id number]    Attending Physician: Enrrique Ren MD  Consulting Physician(s):   Date of Admission: 5/17/2018  9:45 AM  Date of Discharge:       Patient Active Problem List   Diagnosis   • Hypertension   • TIA (transient ischemic attack)   • Vertebral artery stenosis, symptomatic, without infarction   • Rotator cuff insufficiency of right shoulder     [unfilled]    Allergies: No Known Allergies    Current Medications:   Jorge Luis Garces   Home Medication Instructions IMAN:[id number]    Printed on:05/18/18 0757   Medication Information                      aspirin 81 MG tablet  Take 81 mg by mouth Daily. PT HOLDING FOR SURGERY             atorvastatin (LIPITOR) 40 MG tablet  Take 20 mg by mouth Daily.             clopidogrel (PLAVIX) 75 MG tablet  Take 75 mg by mouth Daily. PT TO HOLD 5 DAYS PRIOR TO SURGERY             coenzyme Q10 100 MG capsule  Take 100 mg by mouth Daily. PT HOLDING FOR SURGERY             lisinopril (PRINIVIL,ZESTRIL) 2.5 MG tablet  Take 2.5 mg by mouth Daily.             mometasone (NASONEX) 50 MCG/ACT nasal spray  1 spray into each nostril Daily.             Multiple Vitamins-Minerals (OCUVITE ADULT 50+ PO)  Take 1 tablet by mouth Daily.             mupirocin (BACTROBAN) 2 % nasal ointment  1 application into each nostril Take As Directed. Use as directed prior to OR             naproxen sodium (ALEVE) 220 MG tablet  Take 440 mg by mouth Every 12 (Twelve) Hours As Needed.                     Past Medical History:   Diagnosis Date   • Arthritis    • Double vision    • History of kidney stones    • History of prostate cancer     3 YEARS AGO, HAD EXTERNAL RADIATION   • History of TIA (transient ischemic attack)     X2 , LAST WAS 5 YEARS AGO, ON PLAVIX   • History of TIAs    • Hx of migraines     opthalmic aura of migraines   • Hyperlipidemia    • Hypertension    • Prostate  hypertrophy     HX   • Shoulder pain, right    • Wears hearing aid         Past Surgical History:   Procedure Laterality Date   • CATARACT EXTRACTION Bilateral    • ELBOW ARTHROSCOPY Right    • HERNIA REPAIR     • TONSILLECTOMY     • TOTAL KNEE ARTHROPLASTY Left         Social History     Occupational History   • retired DMD      Social History Main Topics   • Smoking status: Former Smoker   • Smokeless tobacco: Never Used      Comment: QUIT 65 YEARS AGO   • Alcohol use Yes      Comment: 4oz liquor daily   • Drug use: No   • Sexual activity: Defer      Social History     Social History Narrative   • No narrative on file        Family History   Problem Relation Age of Onset   • Heart disease Father    • Stroke Father    • Heart disease Brother    • Diabetes Brother    • Cancer Brother    • Malig Hyperthermia Neg Hx          Physical Exam: 83 y.o. male  General Appearance:    Alert, cooperative, in no acute distress                      Vitals:    05/17/18 1942 05/17/18 2300 05/18/18 0300 05/18/18 0723   BP: 143/70 135/68 141/80 118/67   BP Location: Left arm Left arm Left arm    Patient Position: Lying Lying Lying    Pulse: 59 61 71 65   Resp: 16 16 16 16   Temp: 98 °F (36.7 °C) 98.2 °F (36.8 °C) 98.4 °F (36.9 °C) 98.7 °F (37.1 °C)   TempSrc: Oral Oral Oral Oral   SpO2: 100% 97% 97% 96%   Weight:       Height:            Head:    Normocephalic, without obvious abnormality, atraumatic   Eyes:            Lids and lashes normal, conjunctivae and sclerae normal, no   icterus, no pallor, corneas clear, PERRLA   Ears:    Ears appear intact with no abnormalities noted   Throat:   No oral lesions, no thrush, oral mucosa moist   Neck:   No adenopathy, supple, trachea midline, no thyromegaly, no    carotid bruit, no JVD   Back:     No kyphosis present, no scoliosis present, no skin lesions,       erythema or scars, no tenderness to percussion or                   palpation,   range of motion normal   Lungs:     Clear to  auscultation,respirations regular, even and                   unlabored    Heart:    Regular rhythm and normal rate, normal S1 and S2, no            murmur, no gallop, no rub, no click   Chest Wall:    No abnormalities observed   Abdomen:     Normal bowel sounds, no masses, no organomegaly, soft        non-tender, non-distended, no guarding, no rebound                 tenderness   Rectal:     Deferred   Extremities:   Incision intact without signs or symptoms of infection.               Neurovascular status remains intact to operative extremity.      Moves all extremities well, no edema, no cyanosis, no              redness   Pulses:   Pulses palpable and equal bilaterally   Skin:   No bleeding, bruising or rash   Lymph nodes:   No palpable adenopathy   Neurologic:   Cranial nerves 2 - 12 grossly intact, sensation intact, DTR        present and equal bilaterally           Hospital Course:  83 y.o. male admitted to [facility] to services of Enrrique Ren MD with Rotator cuff insufficiency of right shoulder [M25.311] on 5/17/2018 and underwent [unfilled]  Per [unfilled]. Antibiotic and VTE prophylaxis were per SCIP protocols. Post-operatively the patient transferred to the post-operative floor where the patient underwent mobilization therapy that included active as well as passive ROM exercises. Opioids were titrated to achieve appropriate pain management to allow for participation in mobilization exercises. Vital signs are now stable. The incision is intact without signs or symptoms of infection. Operative extremity neurovascular status remains intact.   Appropriate education re: incision care, activity levels, medications, and follow up visits was completed and all questions were answered. The patient is now deemed stable for discharge to Home.      DIAGNOSTIC TESTS:   [unfilled]      [unfilled]    Discharge and Follow up Instructions:         Date: [unfilled]  Enrrique Ren MD      Time:

## 2018-05-18 NOTE — SIGNIFICANT NOTE
05/18/18 1052   Rehab Time/Intention   Evaluation Not Performed other (see comments)  (Instructed pt. on TSR H.E.P. (Given in written, pictorial format for home use; Pendulums 5-6x's/day). Educated pt. on proper sling placement for optimal support. Pt./family with no further questions/concerns regarding functional mobility. Will sign off.)   Rehab Treatment   Discipline physical therapist

## 2018-05-18 NOTE — DISCHARGE INSTRUCTIONS
Dr. Ren  [address] [facility] [address]  [phone]    TOTAL & REVERSE TOTAL SHOULDER REPLACEMENT  HOSPITAL DISCHARGE INSTRUCTIONS     GENERAL INFORMATION: With improved surgical techniques for total shoulder and reverse total shoulder replacement and the [facility] of ultrasound guided long acting nerve blocks, the hospital stay for patients has decreased significantly. Patients generally go home the following morning after consultation with a physical therapist.  SPECIFIC POST-OP INSTRUCTIONS:  * FOLLOW UP APPOINTMENT: You will need to call our office [phone] and make an appointment to follow up 10-14 days after your date of surgery. We can see you back sooner if there are any problems or concerns.   * BLOOD THINNERS: All patients will be on some type of blood thinner post-op to prevent blood clot. In the hospital, we often use a blood thinning shot the first day post-op. Most patients who are not already on a blood thinner are discharged on over-the-counter aspirin 325mg daily. If you were taking a blood thinner prior to surgery, we will have you resume this on the first day post-op.   * STAPLES: Staples are taken our 10-14 days post-op. This will be done during your first post-op appointment in our office.   * ICE: Ice helps to decrease both pain and swelling. Ice should be applied to the shoulder 20-25 minutes each hour while awake.   * DRESSING CHANGES: We ask that you keep the incision clean and dry. Please use water proof bandaids to cover incision while showering. These can be purchased at your local pharmacy. They may need to be overlapped to cover entire length of incision. These can be changed daily or as needed. Do not use any ointment on the incision.   * SHOWERING: The wound edges typically come together and seal by 3-5 days post-op if there is no drainage. Again, please use waterproof bandaids to cover incision while showering. DO NOT SUBMERSE SHOULDER IN POOL,HOT TUB OR BATH UNTIL AT LEAST 3-4  WEEKS POST-OP (EVEN WITH WATERPROOF BANDAIDS).  * PAIN  MEDICINE: You will be given a prescription for oral pain medication prior to discharge from the hospital. Please let us know if you have a sensitivity to certain pain medications prior to discharge. Additional pain medication prescriptions can be written, but must be picked up at either our [place] or [place] office locations. They can NOT be called into your pharmacy.   * ORAL ANTI-INFLAMMATORIES:   You will be asked to discontinue ALL oral anti-inflammatories prior to surgery. You can resume these the first day post-op.These can be combined with the oral pain medications safely. DO NOT TAKE ADDITIONAL TYLENOL WITH THE NARCOTIC PAIN MEDICATION (it already has Tylenol in it). If you were not taking an anti-inflammatory pre-op, you can start one on the first day post-op. It will help decrease pain and swelling. Typical medications and dosages are as follows:   Advil/Motrin/Ibuprofen 200mg, 4 pills every 8 hours   Aleve/Naproxen Sodium 220mg, 2 pills every 12 hours  * SLING: We recommend you wear the sling at all times, other than when showering or doing physical therapy exercises.    * WEIGHT BEARING: We ask that you be non-weight bearing on the operative shoulder. Do not use the operative arm to lift/push/pull greater than 5lbs.   * PHYSICAL THERAPY: A physical therapist will see you in the hospital your first day post-op. They will teach you some very gentle range of motion exercises to do at home for the first 10-14 days. After we see you in the office during your first post-op visit, we will give you a prescription to start formal physical therapy. This can be done downstairs at [facility] or at a location of your choice. Physical therapy is typically 2-3 times a week for 4-6 weeks, depending on the individual and their progress.   * DRIVING A CAR: Medically/legally we can not recommend you drive a car while in the sling or while on pain medication (roughly  "10-14 days).   * RETURNING TO DAILY AND RECREATIONAL ACTIVITIES: For the most part patients can progress to daily activities as tolerated (keeping in mind the restrictions listed above). Initially, we do not want you to \"overdo\" it in an attempt to minimize post-op pain and swelling. Once the swelling is controlled, you can progress with activities as tolerated. Pain and swelling should be your guide to increasing your activity level.   * RETURN TO WORK: The return to work date depends on many factors and is very dependent on the individual. You would most likely be able to return to a sedentary job after your first post-op visit (10-14 days after surgery). A more physical job would obviously require a longer recovery time before return to work.  "

## 2018-05-18 NOTE — PLAN OF CARE
Problem: Patient Care Overview  Goal: Plan of Care Review  Outcome: Ongoing (interventions implemented as appropriate)   05/18/18 1007   Coping/Psychosocial   Plan of Care Reviewed With patient;spouse   Plan of Care Review   Progress improving   OTHER   Outcome Summary VSS, pt denies pain, will work c/ PT then DC home, pt states ready to go home       Problem: Fall Risk (Adult)  Goal: Absence of Fall  Outcome: Ongoing (interventions implemented as appropriate)      Problem: Shoulder Arthroplasty (Adult)  Goal: Anesthesia/Sedation Recovery  Outcome: Ongoing (interventions implemented as appropriate)

## 2018-05-18 NOTE — PROGRESS NOTES
Orthopedic Progress Note    Subjective     Post-Operative Day: POD#1  Systemic or Specific Complaints: No Complaints. Pain under control with PO meds. Effects of block resolved.     Objective     Vital signs in last 24 hours:  Temp:  [97.1 °F (36.2 °C)-98.7 °F (37.1 °C)] 98.7 °F (37.1 °C)  Heart Rate:  [53-71] 65  Resp:  [16] 16  BP: (118-174)/(59-84) 118/67    General: alert, appears stated age and cooperative   Neurovascular: Radial nerve: Intact, Ulnar nerve: Intact and Median nerve: Intact  Radial pulse: 2+   Wound: Dressing clean and dry no evidence of infection.   Range of Motion: Not tested     Data Review  CBC:  Results from last 7 days  Lab Units 05/18/18  0531   HEMOGLOBIN g/dL 12.9*   HEMATOCRIT % 40.0*       Assessment/Plan     IMPRESSION:stable    Pain Relief: some relief    PLAN:d/c home today after physical therapy. Follow up in office in 10-14 days.    Activity: stay in sling.     LOS: 1 day     Enrrique Ren MD    Date: 5/18/2018  Time: 7:24 AM

## 2018-05-18 NOTE — PLAN OF CARE
Problem: Patient Care Overview  Goal: Plan of Care Review  Outcome: Ongoing (interventions implemented as appropriate)   05/18/18 0440   Coping/Psychosocial   Plan of Care Reviewed With patient   Plan of Care Review   Progress improving   OTHER   Outcome Summary VSS, no complaints of pain, sling in place, voiding per urinal, up with assist of 1, educated on BP monitoring, plans for home in the AM     Goal: Individualization and Mutuality  Outcome: Ongoing (interventions implemented as appropriate)    Goal: Discharge Needs Assessment  Outcome: Ongoing (interventions implemented as appropriate)      Problem: Fall Risk (Adult)  Goal: Absence of Fall  Outcome: Ongoing (interventions implemented as appropriate)      Problem: Shoulder Arthroplasty (Adult)  Goal: Signs and Symptoms of Listed Potential Problems Will be Absent, Minimized or Managed (Shoulder Arthroplasty)  Outcome: Ongoing (interventions implemented as appropriate)    Goal: Anesthesia/Sedation Recovery  Outcome: Ongoing (interventions implemented as appropriate)

## 2020-01-06 ENCOUNTER — TRANSCRIBE ORDERS (OUTPATIENT)
Dept: ADMINISTRATIVE | Facility: HOSPITAL | Age: 85
End: 2020-01-06

## 2020-01-06 DIAGNOSIS — R13.10 DYSPHAGIA, UNSPECIFIED TYPE: Primary | ICD-10-CM

## 2020-01-15 ENCOUNTER — HOSPITAL ENCOUNTER (OUTPATIENT)
Dept: GENERAL RADIOLOGY | Facility: HOSPITAL | Age: 85
Discharge: HOME | End: 2020-01-15
Admitting: OTOLARYNGOLOGY

## 2020-01-15 DIAGNOSIS — R13.10 DYSPHAGIA, UNSPECIFIED TYPE: ICD-10-CM

## 2020-01-15 PROCEDURE — 74221 X-RAY XM ESOPHAGUS 2CNTRST: CPT

## (undated) DEVICE — GLV SURG BIOGEL LTX PF 8

## (undated) DEVICE — KWIRE EQUINOXE GLENOID NITNL 2X190MM NS
Type: IMPLANTABLE DEVICE | Site: SHOULDER | Status: NON-FUNCTIONAL
Removed: 2018-05-17

## (undated) DEVICE — STPLR SKIN VISISTAT WD 35CT

## (undated) DEVICE — MAT FLR ABSORBENT LG 4FT 10 2.5FT

## (undated) DEVICE — SKIN PREP TRAY W/CHG: Brand: MEDLINE INDUSTRIES, INC.

## (undated) DEVICE — SHEET, DRAPE, SPLIT, STERILE: Brand: MEDLINE

## (undated) DEVICE — PK SHLDR OPN 40

## (undated) DEVICE — ARM SLING: Brand: DEROYAL

## (undated) DEVICE — BIT DRL EQUINOXE 2 AND 3.2MM

## (undated) DEVICE — UNDYED BRAIDED (POLYGLACTIN 910), SYNTHETIC ABSORBABLE SUTURE: Brand: COATED VICRYL

## (undated) DEVICE — GLV SURG BIOGEL LTX PF 8 1/2

## (undated) DEVICE — 2108 SERIES SAGITTAL BLADE (19.5 X 1.27 X 81.0MM)

## (undated) DEVICE — OCCLUSIVE GAUZE STRIP,3% BISMUTH TRIBROMOPHENATE IN PETROLATUM BLEND: Brand: XEROFORM

## (undated) DEVICE — SUT VIC 2/0 X1 27IN J459H

## (undated) DEVICE — SUT ETHIB 2 CV V37 MS/4 30IN MX69G

## (undated) DEVICE — HANDPIECE SET WITH COAXIAL HIGH FLOW TIP AND SUCTION TUBE: Brand: INTERPULSE

## (undated) DEVICE — SPNG GZ WOVN 4X4IN 12PLY 10/BX STRL

## (undated) DEVICE — DRSNG PAD ABD 8X10IN STRL

## (undated) DEVICE — GLV SURG BIOGEL LTX PF 6 1/2

## (undated) DEVICE — POOLE SUCTION HANDLE: Brand: CARDINAL HEALTH